# Patient Record
Sex: MALE | Race: BLACK OR AFRICAN AMERICAN | URBAN - METROPOLITAN AREA
[De-identification: names, ages, dates, MRNs, and addresses within clinical notes are randomized per-mention and may not be internally consistent; named-entity substitution may affect disease eponyms.]

---
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
## 2019-01-22 ENCOUNTER — OFFICE VISIT (OUTPATIENT)
Dept: FAMILY MEDICINE | Facility: CLINIC | Age: 19
End: 2019-01-22
Payer: COMMERCIAL

## 2019-01-22 VITALS
BODY MASS INDEX: 32.95 KG/M2 | TEMPERATURE: 97.9 F | HEIGHT: 66 IN | OXYGEN SATURATION: 99 % | DIASTOLIC BLOOD PRESSURE: 76 MMHG | HEART RATE: 85 BPM | SYSTOLIC BLOOD PRESSURE: 111 MMHG | WEIGHT: 205 LBS

## 2019-01-22 DIAGNOSIS — S06.9X9S TRAUMATIC BRAIN INJURY WITH LOSS OF CONSCIOUSNESS, SEQUELA (H): ICD-10-CM

## 2019-01-22 DIAGNOSIS — F43.10 PTSD (POST-TRAUMATIC STRESS DISORDER): Primary | ICD-10-CM

## 2019-01-22 PROCEDURE — 99213 OFFICE O/P EST LOW 20 MIN: CPT | Performed by: FAMILY MEDICINE

## 2019-01-22 ASSESSMENT — MIFFLIN-ST. JEOR: SCORE: 1883.65

## 2019-01-22 NOTE — LETTER
[address] [address] [phone]        January 22, 2019    Regarding:  Rich Mcconnell  [address] [place] [address]              To Whom It May Concern;      Patient was seen in the office today.  He has been my patient for about 5 months.    He has a history of living in a group home.  He has PTSD, lead poisoning as a child and depression and anxiety.  He has not been able to manage his finances well.  He had problems when his mom was the payee and so I am recommending he get a new Rep payee.          Sincerely,          Oniel Jara MD

## 2019-01-22 NOTE — PROGRESS NOTES
"  SUBJECTIVE:   Rich Mcconnell is a 18 year old male who presents to clinic today for the following health issues:      Referral for a Professional Rep Payee     He needs a new Rep Payee    Patient states he is unable to do this     History reviewed. No pertinent past medical history.    History reviewed. No pertinent surgical history.    History reviewed. No pertinent family history.    Social History     Tobacco Use     Smoking status: Current Some Day Smoker     Types: Other     Smokeless tobacco: Never Used   Substance Use Topics     Alcohol use: No     Patient has PTSD   He has a history of lead poisoning as a child     He is currently lives in a group   This used to be handled by his mom   He was not aware they had to pay rent   They went out and spent the money and now it was recommended they get a new payee     History of brain injuries       Problem list and histories reviewed & adjusted, as indicated.    History did not significantly change since the last time he was seen   Patient is currently not in trouble with the law   His mother was the responsible person but this did now work out.   He is seeking outside source for controlling his finances     He does not think he could handle this himself     Asthma control has been good with infrequent use of inhaler     O: /76 (BP Location: Left arm, Patient Position: Sitting, Cuff Size: Adult Large)   Pulse 85   Temp 97.9  F (36.6  C) (Oral)   Ht 1.67 m (5' 5.75\")   Wt 93 kg (205 lb)   SpO2 99%   BMI 33.34 kg/m        Chest wall normal to inspection and palpation. Good excursion bilaterally. Lungs clear to auscultation. Good air movement bilaterally without rales, wheezes, or rhonchi.   Regular rate and  rhythm. S1 and S2 normal, no murmurs, clicks, gallops or rubs. No edema or JVD.    Patient is alert and oriented   He can give me answers to my questions         ICD-10-CM    1. PTSD (post-traumatic stress disorder) F43.10    2. Traumatic brain injury " with loss of consciousness, sequela (H) S06.9X9S      Letter written for the patient

## 2019-01-23 ASSESSMENT — ASTHMA QUESTIONNAIRES: ACT_TOTALSCORE: 19

## 2019-02-25 DIAGNOSIS — R09.82 POST-NASAL DRAINAGE: ICD-10-CM

## 2019-02-26 RX ORDER — LORATADINE 10 MG/1
TABLET ORAL
Qty: 30 TABLET | Refills: 1 | Status: SHIPPED | OUTPATIENT
Start: 2019-02-26 | End: 2019-04-24

## 2019-02-26 NOTE — TELEPHONE ENCOUNTER
Prescription approved per [facility] Refill Protocol.  Rae Lopez, RN CPC Triage.

## 2019-02-26 NOTE — TELEPHONE ENCOUNTER
"Requested Prescriptions   Pending Prescriptions Disp Refills     ALLERGY RELIEF 10 MG tablet [Pharmacy Med Name: LORATADINE 10MG TAB]  10    Last Written Prescription Date:  10-4-18  Last Fill Quantity: 30,  # refills: 1   Last office visit: 1/22/2019 with prescribing provider:  1-22-19   Future Office Visit:     Sig: TAKE 1 TABLET BY MOUTH ONCE DAILY    Antihistamines Protocol Passed - 2/25/2019  5:32 PM       Passed - Patient is 3-64 years of age    Apply weight-based dosing for peds patients age 3 - 12 years of age.    Forward request to provider for patients under the age of 3 or over the age of 64.         Passed - Recent (12 mo) or future (30 days) visit within the authorizing provider's specialty    Patient had office visit in the last 12 months or has a visit in the next 30 days with authorizing provider or within the authorizing provider's specialty.  See \"Patient Info\" tab in inbasket, or \"Choose Columns\" in Meds & Orders section of the refill encounter.             Passed - Medication is active on med list          "

## 2019-02-27 ENCOUNTER — OFFICE VISIT (OUTPATIENT)
Dept: FAMILY MEDICINE | Facility: CLINIC | Age: 19
End: 2019-02-27
Payer: COMMERCIAL

## 2019-02-27 VITALS
SYSTOLIC BLOOD PRESSURE: 107 MMHG | WEIGHT: 202 LBS | TEMPERATURE: 98.4 F | OXYGEN SATURATION: 98 % | HEART RATE: 92 BPM | DIASTOLIC BLOOD PRESSURE: 71 MMHG | HEIGHT: 66 IN | BODY MASS INDEX: 32.47 KG/M2

## 2019-02-27 DIAGNOSIS — Z00.00 ENCOUNTER FOR PREVENTIVE CARE: Primary | ICD-10-CM

## 2019-02-27 DIAGNOSIS — Z23 NEED FOR PROPHYLACTIC VACCINATION WITH TETANUS-DIPHTHERIA (TD): ICD-10-CM

## 2019-02-27 DIAGNOSIS — Z23 NEED FOR DIPHTHERIA-TETANUS-PERTUSSIS (TDAP) VACCINE: ICD-10-CM

## 2019-02-27 PROCEDURE — 99395 PREV VISIT EST AGE 18-39: CPT | Mod: 25 | Performed by: FAMILY MEDICINE

## 2019-02-27 PROCEDURE — 90715 TDAP VACCINE 7 YRS/> IM: CPT | Performed by: FAMILY MEDICINE

## 2019-02-27 PROCEDURE — 90471 IMMUNIZATION ADMIN: CPT | Performed by: FAMILY MEDICINE

## 2019-02-27 RX ORDER — TRAZODONE HYDROCHLORIDE 50 MG/1
50 TABLET, FILM COATED ORAL AT BEDTIME
Qty: 90 TABLET | COMMUNITY
Start: 2019-02-27

## 2019-02-27 ASSESSMENT — ENCOUNTER SYMPTOMS
SORE THROAT: 0
EYE PAIN: 0
DIZZINESS: 1
DIARRHEA: 0
JOINT SWELLING: 0
CONSTIPATION: 0
NERVOUS/ANXIOUS: 1
FEVER: 0
FREQUENCY: 1
WEAKNESS: 0
HEMATURIA: 0
HEADACHES: 1
ARTHRALGIAS: 0
HEMATOCHEZIA: 0
MYALGIAS: 1
ABDOMINAL PAIN: 0
PARESTHESIAS: 0
NAUSEA: 1
COUGH: 0
PALPITATIONS: 0
SHORTNESS OF BREATH: 0
HEARTBURN: 1
CHILLS: 0
DYSURIA: 0

## 2019-02-27 ASSESSMENT — MIFFLIN-ST. JEOR: SCORE: 1870.02

## 2019-02-27 NOTE — LETTER
My Depression Action Plan  Name: Rich Mcconnell   Date of Birth 2000  Date: 2/27/2019    My doctor: Oniel Jara   My clinic: [address] [id number] [phone]          GREEN    ZONE   Good Control    What it looks like:     Things are going generally well. You have normal up s and down s. You may even feel depressed from time to time, but bad moods usually last less than a day.   What you need to do:  1. Continue to care for yourself (see self care plan)  2. Check your depression survival kit and update it as needed  3. Follow your physician s recommendations including any medication.  4. Do not stop taking medication unless you consult with your physician first.           YELLOW         ZONE Getting Worse    What it looks like:     Depression is starting to interfere with your life.     It may be hard to get out of bed; you may be starting to isolate yourself from others.    Symptoms of depression are starting to last most all day and this has happened for several days.     You may have suicidal thoughts but they are not constant.   What you need to do:     1. Call your care team, your response to treatment will improve if you keep your care team informed of your progress. Yellow periods are signs an adjustment may need to be made.     2. Continue your self-care, even if you have to fake it!    3. Talk to someone in your support network    4. Open up your depression survival kit           RED    ZONE Medical Alert - Get Help    What it looks like:     Depression is seriously interfering with your life.     You may experience these or other symptoms: You can t get out of bed most days, can t work or engage in other necessary activities, you have trouble taking care of basic hygiene, or basic responsibilities, thoughts of suicide or death that will not go away, self-injurious behavior.     What you need to  do:  1. Call your care team and request a same-day appointment. If they are not available (weekends or after hours) call your local crisis line, emergency room or 911.            Depression Self Care Plan / Survival Kit    Self-Care for Depression  Here s the deal. Your body and mind are really not as separate as most people think.  What you do and think affects how you feel and how you feel influences what you do and think. This means if you do things that people who feel good do, it will help you feel better.  Sometimes this is all it takes.  There is also a place for medication and therapy depending on how severe your depression is, so be sure to consult with your medical provider and/ or Behavioral Health Consultant if your symptoms are worsening or not improving.     In order to better manage my stress, I will:    Exercise  Get some form of exercise, every day. This will help reduce pain and release endorphins, the  feel good  chemicals in your brain. This is almost as good as taking antidepressants!  This is not the same as joining a gym and then never going! (they count on that by the way ) It can be as simple as just going for a walk or doing some gardening, anything that will get you moving.      Hygiene   Maintain good hygiene (Get out of bed in the morning, Make your bed, Brush your teeth, Take a shower, and Get dressed like you were going to work, even if you are unemployed).  If your clothes don't fit try to get ones that do.    Diet  I will strive to eat foods that are good for me, drink plenty of water, and avoid excessive sugar, caffeine, alcohol, and other mood-altering substances.  Some foods that are helpful in depression are: complex carbohydrates, B vitamins, flaxseed, fish or fish oil, fresh fruits and vegetables.    Psychotherapy  I agree to participate in Individual Therapy (if recommended).    Medication  If prescribed medications, I agree to take them.  Missing doses can result in serious  side effects.  I understand that drinking alcohol, or other illicit drug use, may cause potential side effects.  I will not stop my medication abruptly without first discussing it with my provider.    Staying Connected With Others  I will stay in touch with my friends, family members, and my primary care provider/team.    Use your imagination  Be creative.  We all have a creative side; it doesn t matter if it s oil painting, sand castles, or mud pies! This will also kick up the endorphins.    Witness Beauty  (AKA stop and smell the roses) Take a look outside, even in mid-winter. Notice colors, textures. Watch the squirrels and birds.     Service to others  Be of service to others.  There is always someone else in need.  By helping others we can  get out of ourselves  and remember the really important things.  This also provides opportunities for practicing all the other parts of the program.    Humor  Laugh and be silly!  Adjust your TV habits for less news and crime-drama and more comedy.    Control your stress  Try breathing deep, massage therapy, biofeedback, and meditation. Find time to relax each day.     My support system    Clinic Contact:  Phone number:    Contact 1:  Phone number:    Contact 2:  Phone number:    Congregational/:  Phone number:    Therapist:  Phone number:    Local crisis center:    Phone number:    Other community support:  Phone number:

## 2019-02-27 NOTE — LETTER
My Asthma Action Plan  Name: Rich Mccnonell   YOB: 2000  Date: 2/27/2019   My doctor: Aston Nur MD   My clinic: [facility]        My Control Medicine: Mometasone (Asmanex) -   mcg once a day  My Rescue Medicine: Albuterol (Proair/Ventolin/Proventil) inhaler as needed   My Asthma Severity: intermittent  Avoid your asthma triggers: cold air               GREEN ZONE   Good Control    I feel good    No cough or wheeze    Can work, sleep and play without asthma symptoms       Take your asthma control medicine every day.     1. If exercise triggers your asthma, take your rescue medication    15 minutes before exercise or sports, and    During exercise if you have asthma symptoms  2. Spacer to use with inhaler: If you have a spacer, make sure to use it with your inhaler             YELLOW ZONE Getting Worse  I have ANY of these:    I do not feel good    Cough or wheeze    Chest feels tight    Wake up at night   1. Keep taking your Green Zone medications  2. Start taking your rescue medicine:    every 20 minutes for up to 1 hour. Then every 4 hours for 24-48 hours.  3. If you stay in the Yellow Zone for more than 12-24 hours, contact your doctor.  4. If you do not return to the Green Zone in 12-24 hours or you get worse, start taking your oral steroid medicine if prescribed by your provider.           RED ZONE Medical Alert - Get Help  I have ANY of these:    I feel awful    Medicine is not helping    Breathing getting harder    Trouble walking or talking    Nose opens wide to breathe       1. Take your rescue medicine NOW  2. If your provider has prescribed an oral steroid medicine, start taking it NOW  3. Call your doctor NOW  4. If you are still in the Red Zone after 20 minutes and you have not reached your doctor:    Take your rescue medicine again and    Call 911 or go to the emergency room right away    See your regular doctor within 2 weeks of an Emergency Room or  Urgent Care visit for follow-up treatment.          Annual Reminders:  Meet with Asthma Educator,  Flu Shot in the Fall, consider Pneumonia Vaccination for patients with asthma (aged 19 and older).    Pharmacy: [facility] - [facility] [id number]                      Asthma Triggers  How To Control Things That Make Your Asthma Worse    Triggers are things that make your asthma worse.  Look at the list below to help you find your triggers and what you can do about them.  You can help prevent asthma flare-ups by staying away from your triggers.      Trigger                                                          What you can do   Cigarette Smoke  Tobacco smoke can make asthma worse. Do not allow smoking in your home, car or around you.  Be sure no one smokes at a child s day care or school.  If you smoke, ask your health care provider for ways to help you quit.  Ask family members to quit too.  Ask your health care provider for a referral to Quit Plan to help you quit smoking, or call 2-714-220-PLAN.     Colds, Flu, Bronchitis  These are common triggers of asthma. Wash your hands often.  Don t touch your eyes, nose or mouth.  Get a flu shot every year.     Dust Mites  These are tiny bugs that live in cloth or carpet. They are too small to see. Wash sheets and blankets in hot water every week.   Encase pillows and mattress in dust mite proof covers.  Avoid having carpet if you can. If you have carpet, vacuum weekly.   Use a dust mask and HEPA vacuum.   Pollen and Outdoor Mold  Some people are allergic to trees, grass, or weed pollen, or molds. Try to keep your windows closed.  Limit time out doors when pollen count is high.   Ask you health care provider about taking medicine during allergy season.     Animal Dander  Some people are allergic to skin flakes, urine or saliva from pets with fur or feathers. Keep pets with fur or feathers out of your home.    If you can t keep the pet outdoors,  then keep the pet out of your bedroom.  Keep the bedroom door closed.  Keep pets off cloth furniture and away from stuffed toys.     Mice, Rats, and Cockroaches  Some people are allergic to the waste from these pests.   Cover food and garbage.  Clean up spills and food crumbs.  Store grease in the refrigerator.   Keep food out of the bedroom.   Indoor Mold  This can be a trigger if your home has high moisture. Fix leaking faucets, pipes, or other sources of water.   Clean moldy surfaces.  Dehumidify basement if it is damp and smelly.   Smoke, Strong Odors, and Sprays  These can reduce air quality. Stay away from strong odors and sprays, such as perfume, powder, hair spray, paints, smoke incense, paint, cleaning products, candles and new carpet.   Exercise or Sports  Some people with asthma have this trigger. Be active!  Ask your doctor about taking medicine before sports or exercise to prevent symptoms.    Warm up for 5-10 minutes before and after sports or exercise.     Other Triggers of Asthma  Cold air:  Cover your nose and mouth with a scarf.  Sometimes laughing or crying can be a trigger.  Some medicines and food can trigger asthma.

## 2019-02-27 NOTE — PROGRESS NOTES
SUBJECTIVE:   CC: Rich Mcconnell is an 18 year old male who presents for preventative health visit.   Doing well, has no concerns, living in a group home.   Been seen a psychiatry and has counseling once a week.    Physical   Annual:     Getting at least 3 servings of Calcium per day:  Yes    Bi-annual eye exam:  NO    Dental care twice a year:  Yes    Sleep apnea or symptoms of sleep apnea:  None    Diet:  Breakfast skipped    Frequency of exercise:  2-3 days/week    Duration of exercise:  30-45 minutes    Taking medications regularly:  Yes    Medication side effects:  Other    Additional concerns today:  No    PHQ-2 Total Score: 0              Today's PHQ-2 Score:   PHQ-2 ( 2000 Pfizer) 2/27/2019   Q1: Little interest or pleasure in doing things 0   Q2: Feeling down, depressed or hopeless 0   PHQ-2 Score 0   Q1: Little interest or pleasure in doing things Not at all   Q2: Feeling down, depressed or hopeless Not at all   PHQ-2 Score 0       Abuse: Current or Past(Physical, Sexual or Emotional)- No  Do you feel safe in your environment? Yes    Social History     Tobacco Use     Smoking status: Current Some Day Smoker     Types: Other     Smokeless tobacco: Never Used   Substance Use Topics     Alcohol use: No     Alcohol Use 2/27/2019   If you drink alcohol do you typically have greater than 3 drinks per day OR greater than 7 drinks per week? Not Applicable   No flowsheet data found.    Last PSA: No results found for: PSA    Reviewed orders with patient. Reviewed health maintenance and updated orders accordingly - Yes  BP Readings from Last 3 Encounters:   02/27/19 107/71   01/22/19 111/76   10/04/18 106/70    Wt Readings from Last 3 Encounters:   02/27/19 91.6 kg (202 lb) (93 %)*   01/22/19 93 kg (205 lb) (94 %)*   10/04/18 79.4 kg (175 lb) (77 %)*     * Growth percentiles are based on CDC (Boys, 2-20 Years) data.                  Patient Active Problem List   Diagnosis     Depression, major, recurrent, mild (H)      Attention deficit hyperactivity disorder (ADHD), unspecified ADHD type     Traumatic brain injury with loss of consciousness, sequela (H)     Mild intermittent asthma without complication     History reviewed. No pertinent surgical history.    Social History     Tobacco Use     Smoking status: Current Some Day Smoker     Types: Other     Smokeless tobacco: Never Used   Substance Use Topics     Alcohol use: No     History reviewed. No pertinent family history.      Current Outpatient Medications   Medication Sig Dispense Refill     albuterol (PROAIR HFA/PROVENTIL HFA/VENTOLIN HFA) 108 (90 Base) MCG/ACT inhaler Inhale 2 puffs into the lungs every 6 hours as needed for shortness of breath / dyspnea or wheezing 1 Inhaler 0     ALLERGY RELIEF 10 MG tablet TAKE 1 TABLET BY MOUTH ONCE DAILY 30 tablet 1     ciclesonide (ALVESCO) 160 MCG/ACT inhaler Inhale 2 puffs into the lungs 2 times daily 6.1 g 11     clindamycin (CLINDAGEL) 1 % topical gel Apply topically every morning 60 g 11     fluticasone (FLONASE) 50 MCG/ACT spray Spray 1-2 sprays into both nostrils daily 1 Bottle 11     mometasone (ASMANEX 30 METERED DOSES) 110 MCG/INH inhaler Inhale 1 puff into the lungs daily 3 Inhaler 3     traZODone (DESYREL) 50 MG tablet Take 1 tablet (50 mg) by mouth At Bedtime 90 tablet      Tretinoin (TRETIN-X EX) Externally apply 0.04 % topically       venlafaxine (EFFEXOR-ER) 150 MG TB24 24 hr tablet Take 1 tablet (150 mg) by mouth daily 90 each 3       Reviewed and updated as needed this visit by clinical staff  Tobacco  Allergies  Meds  Med Hx  Surg Hx  Fam Hx  Soc Hx        Reviewed and updated as needed this visit by Provider        History reviewed. No pertinent past medical history.   History reviewed. No pertinent surgical history.         Review of Systems   Constitutional: Negative for chills and fever.   HENT: Negative for congestion, ear pain, hearing loss and sore throat.    Eyes: Negative for pain and visual  "disturbance.   Respiratory: Negative for cough and shortness of breath.    Cardiovascular: Negative for chest pain, palpitations and peripheral edema.   Gastrointestinal: Positive for heartburn and nausea. Negative for abdominal pain, constipation, diarrhea and hematochezia.   Genitourinary: Positive for frequency. Negative for dysuria, genital sores, hematuria and urgency.   Musculoskeletal: Positive for myalgias. Negative for arthralgias and joint swelling.   Skin: Negative for rash.   Neurological: Positive for dizziness and headaches. Negative for weakness and paresthesias.   Psychiatric/Behavioral: Negative for mood changes. The patient is nervous/anxious.      CONSTITUTIONAL: NEGATIVE for fever, chills, change in weight  INTEGUMENTARY/SKIN: NEGATIVE for worrisome rashes, moles or lesions  EYES: NEGATIVE for vision changes or irritation  ENT: NEGATIVE for ear, mouth and throat problems  RESP: NEGATIVE for significant cough or SOB  CV: NEGATIVE for chest pain, palpitations or peripheral edema  GI: NEGATIVE for nausea, abdominal pain, heartburn, or change in bowel habits   male: negative for dysuria, hematuria, decreased urinary stream, erectile dysfunction, urethral discharge  MUSCULOSKELETAL: NEGATIVE for significant arthralgias or myalgia  NEURO: NEGATIVE for weakness, dizziness or paresthesias  PSYCHIATRIC: NEGATIVE for changes in mood or affect    OBJECTIVE:   /71 (BP Location: Right arm, Patient Position: Chair, Cuff Size: Adult Large)   Pulse 92   Temp 98.4  F (36.9  C) (Oral)   Ht 1.67 m (5' 5.75\")   Wt 91.6 kg (202 lb)   SpO2 98%   BMI 32.85 kg/m      Physical Exam  GENERAL: healthy, alert and no distress  EYES: Eyes grossly normal to inspection, PERRL and conjunctivae and sclerae normal  HENT: ear canals and TM's normal, nose and mouth without ulcers or lesions  NECK: no adenopathy, no asymmetry, masses, or scars and thyroid normal to palpation  RESP: lungs clear to auscultation - no " "rales, rhonchi or wheezes  CV: regular rate and rhythm, normal S1 S2, no S3 or S4, no murmur, click or rub, no peripheral edema and peripheral pulses strong  ABDOMEN: soft, nontender, no hepatosplenomegaly, no masses and bowel sounds normal  MS: no gross musculoskeletal defects noted, no edema  SKIN: no suspicious lesions or rashes  NEURO: Normal strength and tone, mentation intact and speech normal  PSYCH: mentation appears normal, affect normal/bright    Diagnostic Test Results:  none     ASSESSMENT/PLAN:       ICD-10-CM    1. Encounter for preventive care Z00.00    2. Need for HPV vaccine Z23 traZODone (DESYREL) 50 MG tablet   3. Need for prophylactic vaccination with tetanus-diphtheria (Td) Z23 traZODone (DESYREL) 50 MG tablet   4. Need for diphtheria-tetanus-pertussis (Tdap) vaccine Z23 TDAP, IM (10 - 64 YRS) - Adacel     SCREENING QUESTIONS FOR ADULT IMMUNIZATIONS       COUNSELING:   Reviewed preventive health counseling, as reflected in patient instructions       Regular exercise       Healthy diet/nutrition       Vision screening       Hearing screening       Immunizations    Vaccinated for: TDAP             Safe sex practices/STD prevention    BP Readings from Last 1 Encounters:   02/27/19 107/71     Estimated body mass index is 32.85 kg/m  as calculated from the following:    Height as of this encounter: 1.67 m (5' 5.75\").    Weight as of this encounter: 91.6 kg (202 lb).           reports that he has been smoking other.  he has never used smokeless tobacco.  Tobacco Cessation Action Plan: Self help information given to patient    Counseling Resources:  ATP IV Guidelines  Pooled Cohorts Equation Calculator  FRAX Risk Assessment  ICSI Preventive Guidelines  Dietary Guidelines for Americans, 2010  USDA's MyPlate  ASA Prophylaxis  Lung CA Screening    Aston Nur MD  [facility]  "

## 2019-02-27 NOTE — NURSING NOTE
Prior to injection, verified patient identity using patient's name and date of birth.  Due to injection administration, patient instructed to remain in clinic for 15 minutes  afterwards, and to report any adverse reaction to me immediately.    Tdap    Drug Amount Wasted:  None.  Vial/Syringe: Syringe  Expiration Date:  11/02/2020    VIS for Tdap given on same date of administration.  Staff signature/Title: Brenna Baker CMA on 2/27/2019 at 9:14 AM

## 2019-02-28 ASSESSMENT — ASTHMA QUESTIONNAIRES: ACT_TOTALSCORE: 18

## 2019-03-04 ENCOUNTER — TELEPHONE (OUTPATIENT)
Dept: FAMILY MEDICINE | Facility: CLINIC | Age: 19
End: 2019-03-04

## 2019-03-06 ENCOUNTER — TELEPHONE (OUTPATIENT)
Dept: FAMILY MEDICINE | Facility: CLINIC | Age: 19
End: 2019-03-06

## 2019-03-06 NOTE — TELEPHONE ENCOUNTER
Forms received from:[facility]   Phone number listed: [phone]   Fax listed: [phone]  Date received: 03/06/2019  Form description: referred to [facility]  Once forms are completed, please return to [facility] via fax.  Form placed: in providers folder  Yesica Cortés

## 2019-04-05 ENCOUNTER — OFFICE VISIT (OUTPATIENT)
Dept: FAMILY MEDICINE | Facility: CLINIC | Age: 19
End: 2019-04-05
Payer: COMMERCIAL

## 2019-04-05 VITALS
OXYGEN SATURATION: 98 % | DIASTOLIC BLOOD PRESSURE: 65 MMHG | HEART RATE: 73 BPM | BODY MASS INDEX: 32.85 KG/M2 | TEMPERATURE: 98.5 F | SYSTOLIC BLOOD PRESSURE: 102 MMHG | WEIGHT: 202 LBS

## 2019-04-05 DIAGNOSIS — J45.40 MODERATE PERSISTENT ASTHMA WITHOUT COMPLICATION: ICD-10-CM

## 2019-04-05 DIAGNOSIS — Z91.018 ALLERGY TO FOOD: ICD-10-CM

## 2019-04-05 DIAGNOSIS — Z11.3 SCREEN FOR STD (SEXUALLY TRANSMITTED DISEASE): Primary | ICD-10-CM

## 2019-04-05 PROCEDURE — 86803 HEPATITIS C AB TEST: CPT | Performed by: PHYSICIAN ASSISTANT

## 2019-04-05 PROCEDURE — 99213 OFFICE O/P EST LOW 20 MIN: CPT | Performed by: PHYSICIAN ASSISTANT

## 2019-04-05 PROCEDURE — 36415 COLL VENOUS BLD VENIPUNCTURE: CPT | Performed by: PHYSICIAN ASSISTANT

## 2019-04-05 PROCEDURE — 87491 CHLMYD TRACH DNA AMP PROBE: CPT | Performed by: PHYSICIAN ASSISTANT

## 2019-04-05 PROCEDURE — 87591 N.GONORRHOEAE DNA AMP PROB: CPT | Performed by: PHYSICIAN ASSISTANT

## 2019-04-05 PROCEDURE — 0064U ANTB TP TOTAL&RPR IA QUAL: CPT | Performed by: PHYSICIAN ASSISTANT

## 2019-04-05 PROCEDURE — 87389 HIV-1 AG W/HIV-1&-2 AB AG IA: CPT | Performed by: PHYSICIAN ASSISTANT

## 2019-04-05 RX ORDER — ALBUTEROL SULFATE 90 UG/1
2 AEROSOL, METERED RESPIRATORY (INHALATION) EVERY 6 HOURS PRN
Qty: 18 G | Refills: 0 | Status: SHIPPED | OUTPATIENT
Start: 2019-04-05

## 2019-04-05 RX ORDER — EPINEPHRINE 0.3 MG/.3ML
0.3 INJECTION SUBCUTANEOUS PRN
Qty: 2 ML | Refills: 1 | Status: SHIPPED | OUTPATIENT
Start: 2019-04-05

## 2019-04-05 ASSESSMENT — ANXIETY QUESTIONNAIRES
3. WORRYING TOO MUCH ABOUT DIFFERENT THINGS: NOT AT ALL
5. BEING SO RESTLESS THAT IT IS HARD TO SIT STILL: NOT AT ALL
6. BECOMING EASILY ANNOYED OR IRRITABLE: NEARLY EVERY DAY
GAD7 TOTAL SCORE: 5
IF YOU CHECKED OFF ANY PROBLEMS ON THIS QUESTIONNAIRE, HOW DIFFICULT HAVE THESE PROBLEMS MADE IT FOR YOU TO DO YOUR WORK, TAKE CARE OF THINGS AT HOME, OR GET ALONG WITH OTHER PEOPLE: SOMEWHAT DIFFICULT
1. FEELING NERVOUS, ANXIOUS, OR ON EDGE: MORE THAN HALF THE DAYS
7. FEELING AFRAID AS IF SOMETHING AWFUL MIGHT HAPPEN: NOT AT ALL
2. NOT BEING ABLE TO STOP OR CONTROL WORRYING: NOT AT ALL

## 2019-04-05 ASSESSMENT — PATIENT HEALTH QUESTIONNAIRE - PHQ9
SUM OF ALL RESPONSES TO PHQ QUESTIONS 1-9: 5
5. POOR APPETITE OR OVEREATING: NOT AT ALL

## 2019-04-05 NOTE — LETTER
[facility]   [address] [place]  [address]  [phone]                                   April 8, 2019    Rich Mcconnell  [address] [facility] [address]        Dear Rich,     STD testing is negative    Results for orders placed or performed in visit on 04/05/19   Hepatitis C antibody   Result Value Ref Range    Hepatitis C Antibody Nonreactive NR^Nonreactive   HIV Antigen Antibody Combo   Result Value Ref Range    HIV Antigen Antibody Combo Nonreactive NR^Nonreactive       Treponema Abs w Reflex to RPR and Titer   Result Value Ref Range    Treponema Antibodies Nonreactive NR^Nonreactive   Chlamydia trachomatis PCR   Result Value Ref Range    Specimen Description Urine     Chlamydia Trachomatis PCR Negative NEG^Negative   Neisseria gonorrhoeae PCR   Result Value Ref Range    Specimen Descrip Urine     N Gonorrhea PCR Negative NEG^Negative       If you have any questions please call the clinic at [phone]    Sincerely,    Pippa Villanueva PA-C  [facility]

## 2019-04-05 NOTE — PROGRESS NOTES
SUBJECTIVE:   Rich Mcconnell is a 19 year old male who presents to clinic today for the following health issues:        STD check  No symptoms or concerns.     Doing well mood and asthma.          Problem list and histories reviewed & adjusted, as indicated.  Additional history: as documented    Patient Active Problem List   Diagnosis     Depression, major, recurrent, mild (H)     Attention deficit hyperactivity disorder (ADHD), unspecified ADHD type     Traumatic brain injury with loss of consciousness, sequela (H)     Mild intermittent asthma without complication     History reviewed. No pertinent surgical history.    Social History     Tobacco Use     Smoking status: Current Some Day Smoker     Types: Other     Smokeless tobacco: Never Used   Substance Use Topics     Alcohol use: No     History reviewed. No pertinent family history.        Reviewed and updated as needed this visit by clinical staff  Tobacco  Allergies  Meds  Med Hx  Surg Hx  Fam Hx  Soc Hx      Reviewed and updated as needed this visit by Provider  Allergies  Meds         ROS:  As above    OBJECTIVE:     /65   Pulse 73   Temp 98.5  F (36.9  C) (Oral)   Wt 91.6 kg (202 lb)   SpO2 98%   BMI 32.85 kg/m    Body mass index is 32.85 kg/m .  GENERAL: alert, no distress and obese  RESP: lungs clear to auscultation - no rales, rhonchi or wheezes  CV: regular rates and rhythm, normal S1 S2, no S3 or S4 and no murmur, click or rub    Diagnostic Test Results:  none     ASSESSMENT/PLAN:       1. Screen for STD (sexually transmitted disease)  Follow up as needed.  Has had new partner since last testing   - Chlamydia trachomatis PCR  - Neisseria gonorrhoeae PCR  - Hepatitis C antibody  - HIV Antigen Antibody Combo  - Treponema Abs w Reflex to RPR and Titer    2. Moderate persistent asthma without complication  Stable. refilled  - albuterol (PROAIR HFA/PROVENTIL HFA/VENTOLIN HFA) 108 (90 Base) MCG/ACT inhaler; Inhale 2 puffs into the lungs  every 6 hours as needed for shortness of breath / dyspnea or wheezing  Dispense: 18 g; Refill: 0    3. Allergy to food    - EPINEPHrine (EPIPEN/ADRENACLICK/OR ANY BX GENERIC EQUIV) 0.3 MG/0.3ML injection 2-pack; Inject 0.3 mLs (0.3 mg) into the muscle as needed for anaphylaxis  Dispense: 2 mL; Refill: 1        Pippa Villanueva PA-C  [facility]

## 2019-04-06 LAB — T PALLIDUM AB SER QL: NONREACTIVE

## 2019-04-06 ASSESSMENT — ANXIETY QUESTIONNAIRES: GAD7 TOTAL SCORE: 5

## 2019-04-06 ASSESSMENT — ASTHMA QUESTIONNAIRES: ACT_TOTALSCORE: 22

## 2019-04-07 LAB
C TRACH DNA SPEC QL NAA+PROBE: NEGATIVE
N GONORRHOEA DNA SPEC QL NAA+PROBE: NEGATIVE
SPECIMEN SOURCE: NORMAL
SPECIMEN SOURCE: NORMAL

## 2019-04-08 LAB
HCV AB SERPL QL IA: NONREACTIVE
HIV 1+2 AB+HIV1 P24 AG SERPL QL IA: NONREACTIVE

## 2019-04-24 DIAGNOSIS — R09.82 POST-NASAL DRAINAGE: ICD-10-CM

## 2019-04-24 NOTE — TELEPHONE ENCOUNTER
"Requested Prescriptions   Pending Prescriptions Disp Refills     ALLERGY RELIEF 10 MG tablet [Pharmacy Med Name: LORATADINE 10MG TAB]  10     Sig: TAKE 1 TABLET BY MOUTH ONCE DAILY   Last Written Prescription Date:  2-26-19  Last Fill Quantity: 30,  # refills: 1   Last office visit: 4/5/2019 with prescribing provider:  1-22-19   Future Office Visit:        Antihistamines Protocol Passed - 4/24/2019 12:35 PM        Passed - Patient is 3-64 years of age     Apply weight-based dosing for peds patients age 3 - 12 years of age.    Forward request to provider for patients under the age of 3 or over the age of 64.          Passed - Recent (12 mo) or future (30 days) visit within the authorizing provider's specialty     Patient had office visit in the last 12 months or has a visit in the next 30 days with authorizing provider or within the authorizing provider's specialty.  See \"Patient Info\" tab in inbasket, or \"Choose Columns\" in Meds & Orders section of the refill encounter.              Passed - Medication is active on med list      '    "

## 2019-04-25 RX ORDER — LORATADINE 10 MG/1
TABLET ORAL
Qty: 30 TABLET | Refills: 0 | Status: SHIPPED | OUTPATIENT
Start: 2019-04-25 | End: 2019-05-16

## 2019-04-25 NOTE — TELEPHONE ENCOUNTER
Prescription approved per [facility] Refill Protocol.  Karoilna Wang RN

## 2019-05-16 DIAGNOSIS — R09.82 POST-NASAL DRAINAGE: ICD-10-CM

## 2019-05-16 NOTE — TELEPHONE ENCOUNTER
"Requested Prescriptions   Pending Prescriptions Disp Refills     ALLERGY RELIEF 10 MG tablet [Pharmacy Med Name: LORATADINE 10MG TAB]  11     Sig: TAKE 1 TABLET BY MOUTH ONCE DAILY   *1 TOTAL FILL*   Last Written Prescription Date:  4-25-19  Last Fill Quantity: 30,  # refills: 0   Last office visit: 4/5/2019 with prescribing provider:  1-22-19   Future Office Visit:        Antihistamines Protocol Passed - 5/16/2019 10:25 AM        Passed - Patient is 3-64 years of age     Apply weight-based dosing for peds patients age 3 - 12 years of age.    Forward request to provider for patients under the age of 3 or over the age of 64.          Passed - Recent (12 mo) or future (30 days) visit within the authorizing provider's specialty     Patient had office visit in the last 12 months or has a visit in the next 30 days with authorizing provider or within the authorizing provider's specialty.  See \"Patient Info\" tab in inbasket, or \"Choose Columns\" in Meds & Orders section of the refill encounter.              Passed - Medication is active on med list          "

## 2019-05-17 RX ORDER — LORATADINE 10 MG/1
TABLET ORAL
Qty: 31 TABLET | Refills: 11 | Status: SHIPPED | OUTPATIENT
Start: 2019-05-17

## 2019-07-18 ENCOUNTER — OFFICE VISIT (OUTPATIENT)
Dept: FAMILY MEDICINE | Facility: CLINIC | Age: 19
End: 2019-07-18
Payer: COMMERCIAL

## 2019-07-18 VITALS
OXYGEN SATURATION: 99 % | TEMPERATURE: 97.1 F | DIASTOLIC BLOOD PRESSURE: 73 MMHG | HEART RATE: 64 BPM | WEIGHT: 206 LBS | SYSTOLIC BLOOD PRESSURE: 109 MMHG | BODY MASS INDEX: 33.5 KG/M2

## 2019-07-18 DIAGNOSIS — H69.92 DYSFUNCTION OF LEFT EUSTACHIAN TUBE: Primary | ICD-10-CM

## 2019-07-18 PROCEDURE — 99213 OFFICE O/P EST LOW 20 MIN: CPT | Performed by: FAMILY MEDICINE

## 2019-07-18 NOTE — PROGRESS NOTES
Subjective     Rich Mcconnell is a 19 year old male who presents to clinic today for the following health issues:    HPI   Patient is here for right ear problem. He states he feels as if there is something in it for a few days now and there is a bit of pain too.    She denies fever..  He has allergies and takes Claritin and steroid nasal spray  None of these seem to be helping the problem at this point  He has no cold symptoms      Current Outpatient Medications   Medication Sig Dispense Refill     albuterol (PROAIR HFA/PROVENTIL HFA/VENTOLIN HFA) 108 (90 Base) MCG/ACT inhaler Inhale 2 puffs into the lungs every 6 hours as needed for shortness of breath / dyspnea or wheezing 18 g 0     ALLERGY RELIEF 10 MG tablet TAKE 1 TABLET BY MOUTH ONCE DAILY   *1 TOTAL FILL* 31 tablet 11     clindamycin (CLINDAGEL) 1 % topical gel Apply topically every morning 60 g 11     EPINEPHrine (EPIPEN/ADRENACLICK/OR ANY BX GENERIC EQUIV) 0.3 MG/0.3ML injection 2-pack Inject 0.3 mLs (0.3 mg) into the muscle as needed for anaphylaxis 2 mL 1     fluticasone (FLONASE) 50 MCG/ACT spray Spray 1-2 sprays into both nostrils daily 1 Bottle 11     mometasone (ASMANEX 30 METERED DOSES) 110 MCG/INH inhaler Inhale 1 puff into the lungs daily 3 Inhaler 3     traZODone (DESYREL) 50 MG tablet Take 1 tablet (50 mg) by mouth At Bedtime 90 tablet      Tretinoin (TRETIN-X EX) Externally apply 0.04 % topically       venlafaxine (EFFEXOR-ER) 150 MG TB24 24 hr tablet Take 1 tablet (150 mg) by mouth daily 90 each 3       O: /73 (BP Location: Left arm, Patient Position: Chair, Cuff Size: Adult Large)   Pulse 64   Temp 97.1  F (36.2  C) (Oral)   Wt 93.4 kg (206 lb)   SpO2 99%   BMI 33.50 kg/m      Head: Normocephalic, atraumatic.  Eyes: Conjunctiva clear, non icteric. PERRLA.  Ears: External ears and TMs normal BL.  Nose: Septum midline, nasal mucosa pink and moist. No discharge.  Mouth / Throat: Normal dentition.  No oral lesions. Pharynx non  erythematous, tonsils without hypertrophy.  Neck: Supple, no enlarged LN, trachea midline.    .    ICD-10-CM    1. Dysfunction of left eustachian tube H69.82      Patient was told that he should do Valsalva maneuvers by out through a closed nasal system and this should go away in a few days.

## 2019-08-27 ENCOUNTER — TELEPHONE (OUTPATIENT)
Dept: FAMILY MEDICINE | Facility: CLINIC | Age: 19
End: 2019-08-27

## 2019-08-27 DIAGNOSIS — R09.82 POST-NASAL DRAINAGE: ICD-10-CM

## 2019-08-27 RX ORDER — LORATADINE 10 MG/1
TABLET ORAL
Qty: 31 TABLET | Refills: 11 | Status: CANCELLED | OUTPATIENT
Start: 2019-08-27

## 2019-08-27 NOTE — TELEPHONE ENCOUNTER
Called patient back, looks like 11 refills were order back in May. Left message requesting call back to RN line.  Sanam Landis RN

## 2019-08-27 NOTE — TELEPHONE ENCOUNTER
Reason for Call:  Other prescription    Detailed comments: Would like a refill on loradine   10 mg. . [facility] pharmacy     Phone Number Patient can be reached at: Other phone number:    [facility]  [phone]         Best Time:     Can we leave a detailed message on this number? YES    Call taken on 8/27/2019 at 9:25 AM by Yesica Cortés

## 2019-08-28 NOTE — TELEPHONE ENCOUNTER
Notified Claudia that he has refills already.  She stated she will call pharmacy.    Rae Lopez RN [facility] Triage.

## 2019-10-01 ENCOUNTER — TELEPHONE (OUTPATIENT)
Dept: FAMILY MEDICINE | Facility: CLINIC | Age: 19
End: 2019-10-01

## 2019-10-01 NOTE — TELEPHONE ENCOUNTER
Forms received from: [facility]    Phone number listed: [phone]   Fax listed: [phone]  Date received: 10/01/2019  Form description: standing orders for over the counter medications  Once forms are completed, please return to [facility] via fax.  Form placed: in providers folder  Yesica Cortsé

## 2019-10-25 ENCOUNTER — IMMUNIZATION (OUTPATIENT)
Dept: NURSING | Facility: CLINIC | Age: 19
End: 2019-10-25
Payer: COMMERCIAL

## 2019-10-25 DIAGNOSIS — F90.9 ATTENTION DEFICIT HYPERACTIVITY DISORDER (ADHD), UNSPECIFIED ADHD TYPE: Primary | ICD-10-CM

## 2019-10-25 DIAGNOSIS — Z23 NEED FOR PROPHYLACTIC VACCINATION AND INOCULATION AGAINST INFLUENZA: Primary | ICD-10-CM

## 2019-10-25 PROCEDURE — 99207 ZZC NO CHARGE NURSE ONLY: CPT

## 2019-10-25 PROCEDURE — 90471 IMMUNIZATION ADMIN: CPT

## 2019-10-25 PROCEDURE — 90686 IIV4 VACC NO PRSV 0.5 ML IM: CPT

## 2019-10-25 RX ORDER — VENLAFAXINE HYDROCHLORIDE 150 MG/1
CAPSULE, EXTENDED RELEASE ORAL
Qty: 90 CAPSULE | Refills: 3 | Status: SHIPPED | OUTPATIENT
Start: 2019-10-25

## 2019-10-25 NOTE — TELEPHONE ENCOUNTER
Routing refill request to provider for review/approval because:  Failed protocol.    Rae Lopez RN,BSN  [facility]

## 2019-10-25 NOTE — TELEPHONE ENCOUNTER
"Requested Prescriptions   Pending Prescriptions Disp Refills     venlafaxine (EFFEXOR-XR) 150 MG 24 hr capsule [Pharmacy Med Name: VENLAFAXINE CAP 150MG ER]  11     Sig: TAKE 1 CAPSULE BY MOUTH ONCE DAILY. *1 TOTAL FILL*   Last Written Prescription Date:  8-13-18  Last Fill Quantity: 90,  # refills: 3   Last office visit: 7/18/2019 with prescribing provider:  7-18-19   Future Office Visit:        Serotonin-Norepinephrine Reuptake Inhibitors  Failed - 10/25/2019  9:52 AM        Failed - Normal serum creatinine on file in past 12 months     No lab results found.          Passed - Blood pressure under 140/90 in past 12 months     BP Readings from Last 3 Encounters:   07/18/19 109/73   04/05/19 102/65   02/27/19 107/71                 Passed - Recent (12 mo) or future (30 days) visit within the authorizing provider's specialty     Patient has had an office visit with the authorizing provider or a provider within the authorizing providers department within the previous 12 mos or has a future within next 30 days. See \"Patient Info\" tab in inbasket, or \"Choose Columns\" in Meds & Orders section of the refill encounter.              Passed - Medication is active on med list        Passed - Patient is age 18 or older          "

## 2020-07-03 ENCOUNTER — TELEPHONE (OUTPATIENT)
Dept: FAMILY MEDICINE | Facility: CLINIC | Age: 20
End: 2020-07-03

## 2020-07-03 NOTE — TELEPHONE ENCOUNTER
Panel Management Review      Patient has the following on his problem list:     Asthma review     ACT Total Scores 4/5/2019   ACT TOTAL SCORE (Goal Greater than or Equal to 20) 22   In the past 12 months, how many times did you visit the emergency room for your asthma without being admitted to the hospital? 0   In the past 12 months, how many times were you hospitalized overnight because of your asthma? 0      1. Is Asthma diagnosis on the Problem List? Yes    2. Is Asthma listed on Health Maintenance? Yes    3. Patient is due for:  ACT and AAP      Composite cancer screening  Chart review shows that this patient is due/due soon for the following None  Summary:    Patient is due/failing the following:   AAP, ACT and PHYSICAL    Action needed:   Patient needs office visit for preventive care. and Patient needs to do ACT.    Type of outreach:    Copy of ACT mailed to patient, will reach out in 5 days.    Questions for provider review:    None                                                                                                                                    Deepa Worrell,        Chart routed to Care Team .

## 2020-07-03 NOTE — LETTER
July 3, 2020    Rich Mcconnell  [address] [address]    Dear Rich,    Your [facility] Team works hard to make sure that you and your family receive exceptional care. Enclosed you will find a copy of the Asthma Control Test (ACT) that our clinic uses to monitor and manage your asthma. This test is an assessment tool that we use to determine how well your asthma is controlled.      Please complete the enclosed questionnaire and mail it back to us in the self-addressed stamped envelope. We can also complete the questions over the phone if you keep a copy of the ACT for your reference, call [phone] to speak with a member of your care team.    We are also recommending follow up on these items:  - Scheduling an Annual Physical / Wellness Visit with your primary care provider    Healthy regards,    Your [facility]    (Team 2)

## 2020-07-10 NOTE — TELEPHONE ENCOUNTER
Patient Quality Outreach Summary      Summary:    Patient is due/failing the following:   ACT needed    Type of outreach:    Phone, left message for patient/parent to call back.    Questions for provider review:    None                                                                                                                    Deepa Worrell,

## 2021-05-08 ENCOUNTER — HOSPITAL ENCOUNTER (EMERGENCY)
Facility: CLINIC | Age: 21
Discharge: LEFT BEFORE DISCHARGE | End: 2021-05-08
Attending: EMERGENCY MEDICINE | Admitting: EMERGENCY MEDICINE
Payer: COMMERCIAL

## 2021-05-08 VITALS
OXYGEN SATURATION: 96 % | WEIGHT: 180 LBS | RESPIRATION RATE: 22 BRPM | HEIGHT: 67 IN | HEART RATE: 57 BPM | TEMPERATURE: 98.6 F | BODY MASS INDEX: 28.25 KG/M2

## 2021-05-08 DIAGNOSIS — J45.909 UNCOMPLICATED ASTHMA, UNSPECIFIED ASTHMA SEVERITY, UNSPECIFIED WHETHER PERSISTENT: ICD-10-CM

## 2021-05-08 PROCEDURE — 94640 AIRWAY INHALATION TREATMENT: CPT | Performed by: EMERGENCY MEDICINE

## 2021-05-08 PROCEDURE — 250N000013 HC RX MED GY IP 250 OP 250 PS 637: Performed by: EMERGENCY MEDICINE

## 2021-05-08 PROCEDURE — 99283 EMERGENCY DEPT VISIT LOW MDM: CPT | Mod: 25 | Performed by: EMERGENCY MEDICINE

## 2021-05-08 PROCEDURE — 99284 EMERGENCY DEPT VISIT MOD MDM: CPT | Performed by: EMERGENCY MEDICINE

## 2021-05-08 RX ORDER — ALBUTEROL SULFATE 90 UG/1
2 AEROSOL, METERED RESPIRATORY (INHALATION) ONCE
Status: COMPLETED | OUTPATIENT
Start: 2021-05-08 | End: 2021-05-08

## 2021-05-08 RX ORDER — ALBUTEROL SULFATE 90 UG/1
2 AEROSOL, METERED RESPIRATORY (INHALATION) EVERY 4 HOURS PRN
Qty: 18 G | Refills: 3 | Status: SHIPPED | OUTPATIENT
Start: 2021-05-08 | End: 2021-05-18

## 2021-05-08 RX ADMIN — ALBUTEROL SULFATE 2 PUFF: 108 INHALANT RESPIRATORY (INHALATION) at 02:46

## 2021-05-08 ASSESSMENT — MIFFLIN-ST. JEOR: SCORE: 1775.1

## 2021-05-08 NOTE — ED PROVIDER NOTES
ED Provider Note  [facility]      History     Chief Complaint   Patient presents with     Shortness of Breath     HPI  Christian Gonzáles is a 21 year old male who has a past medical history of asthma who presents with shortness of breath.  Patient reports symptoms started about an hour prior to arrival and he did not have his albuterol inhaler with him.  Reports this feels consistent with his usual asthma exacerbations.  He reports he uses albuterol for control, no recent systemic steroids.  Remote history of hospitalization secondary to asthma but no history of intubations.  Patient states he is otherwise been feeling well, no recent URI symptoms, no Covid exposures. He denies chest pain, no lower extremity swelling or pain.  Patient denies any other significant past medical history including no underlying cardiac history, other lung history, no history of DVTs or PEs.  He has had no recent immobilizations or surgeries.    Patient does note cats are a trigger for his asthma and the apartment he was in did have cats.    Past Medical History  Past Medical History:   Diagnosis Date     Uncomplicated asthma      History reviewed. No pertinent surgical history.  albuterol (PROAIR HFA) 108 (90 Base) MCG/ACT inhaler      Allergies   Allergen Reactions     Peanuts [Nuts]      Seafood      Family History  History reviewed. No pertinent family history.  Social History   Social History     Tobacco Use     Smoking status: Never Smoker     Smokeless tobacco: Never Used   Substance Use Topics     Alcohol use: Not Currently     Drug use: Yes     Types: Marijuana      Past medical history, past surgical history, medications, allergies, family history, and social history were reviewed with the patient. No additional pertinent items.       Review of Systems  A complete review of systems was performed with pertinent positives and negatives noted in the HPI, and all other systems negative.    Physical Exam  "  Pulse: 57  Temp: 98.6  F (37  C)  Resp: 22  Height: 170.2 cm (5' 7\")  Weight: 81.6 kg (180 lb)  SpO2: 96 %  Physical Exam  General: awake, alert, NAD  Head: normal cephalic  HEENT: pupils equal, conjugate gaze intact  Neck: Supple  CV: regular rate and rhythm without murmur  Lungs: Diffuse wheezes in all lung fields.  No increased work of breathing.  No use of accessory muscles.  Abd: soft, non-tender, no guarding, no peritoneal signs  EXT: lower extremities without swelling or edema  Neuro: awake, answers questions appropriately. No focal deficits noted       ED Course      Procedures               No results found for any visits on 05/08/21.  Medications   albuterol (PROAIR HFA/PROVENTIL HFA/VENTOLIN HFA) 108 (90 Base) MCG/ACT inhaler 2 puff (2 puffs Inhalation Given 5/8/21 0246)        Assessments & Plan (with Medical Decision Making)   Fay is a 21-year-old male who presents with asthma exacerbation.  He reports feeling short of breath and wheezy though denies chest pain, URI complaints.  Patient has normal vital signs.    Patient is wheezing on exam but is in no respiratory distress.  No other abnormalities noted on physical exam which is reassuring.    Have suspicion this is asthma exacerbation triggered by exposure to cats.  Patient was given an albuterol inhaler in the ER and reported improvement of symptoms.  On repeat assessment lungs were clear.  Did order a chest x-ray the patient wanted to leave prior chest x-ray completion.  Wanted to rule out pneumothoraces or other potential acute lung pathology.  Patient asked if he could sign out AMA.  He knew chest x-ray is pending and I was worried about other potential dangerous pathology though he wanted to leave.    He was discharged home with an albuterol prescription, refills, and instructions to return to the ER for new or worsening symptoms or if he wanted to complete his evaluation.    I have reviewed the nursing notes. I have reviewed the findings, " diagnosis, plan and need for follow up with the patient.    New Prescriptions    ALBUTEROL (PROAIR HFA) 108 (90 BASE) MCG/ACT INHALER    Inhale 2 puffs into the lungs every 4 hours as needed for shortness of breath / dyspnea or wheezing       Final diagnoses:   Uncomplicated asthma, unspecified asthma severity, unspecified whether persistent         [facility] EMERGENCY DEPARTMENT  5/8/2021     Héctor Sher MD  05/08/21 1234